# Patient Record
Sex: MALE | Race: WHITE | NOT HISPANIC OR LATINO
[De-identification: names, ages, dates, MRNs, and addresses within clinical notes are randomized per-mention and may not be internally consistent; named-entity substitution may affect disease eponyms.]

---

## 2018-09-19 ENCOUNTER — APPOINTMENT (OUTPATIENT)
Dept: NEUROSURGERY | Facility: CLINIC | Age: 63
End: 2018-09-19

## 2018-12-11 ENCOUNTER — APPOINTMENT (OUTPATIENT)
Dept: UROLOGY | Facility: CLINIC | Age: 63
End: 2018-12-11

## 2021-01-01 ENCOUNTER — APPOINTMENT (OUTPATIENT)
Dept: HEMATOLOGY ONCOLOGY | Facility: CLINIC | Age: 66
End: 2021-01-01

## 2021-01-01 ENCOUNTER — NON-APPOINTMENT (OUTPATIENT)
Age: 66
End: 2021-01-01

## 2021-01-01 ENCOUNTER — APPOINTMENT (OUTPATIENT)
Dept: GASTROENTEROLOGY | Facility: AMBULATORY MEDICAL SERVICES | Age: 66
End: 2021-01-01

## 2021-01-01 ENCOUNTER — INPATIENT (INPATIENT)
Facility: HOSPITAL | Age: 66
LOS: 0 days | End: 2021-11-08
Attending: INTERNAL MEDICINE | Admitting: INTERNAL MEDICINE
Payer: MEDICARE

## 2021-01-01 ENCOUNTER — APPOINTMENT (OUTPATIENT)
Dept: GASTROENTEROLOGY | Facility: CLINIC | Age: 66
End: 2021-01-01
Payer: MEDICARE

## 2021-01-01 ENCOUNTER — TRANSCRIPTION ENCOUNTER (OUTPATIENT)
Age: 66
End: 2021-01-01

## 2021-01-01 ENCOUNTER — APPOINTMENT (OUTPATIENT)
Dept: HEPATOLOGY | Facility: CLINIC | Age: 66
End: 2021-01-01

## 2021-01-01 ENCOUNTER — LABORATORY RESULT (OUTPATIENT)
Age: 66
End: 2021-01-01

## 2021-01-01 ENCOUNTER — APPOINTMENT (OUTPATIENT)
Dept: HEPATOLOGY | Facility: CLINIC | Age: 66
End: 2021-01-01
Payer: MEDICARE

## 2021-01-01 VITALS
DIASTOLIC BLOOD PRESSURE: 73 MMHG | OXYGEN SATURATION: 85 % | TEMPERATURE: 97 F | SYSTOLIC BLOOD PRESSURE: 119 MMHG | HEART RATE: 117 BPM

## 2021-01-01 VITALS
TEMPERATURE: 97.6 F | RESPIRATION RATE: 16 BRPM | WEIGHT: 105 LBS | HEIGHT: 69 IN | DIASTOLIC BLOOD PRESSURE: 64 MMHG | BODY MASS INDEX: 15.55 KG/M2 | SYSTOLIC BLOOD PRESSURE: 101 MMHG | HEART RATE: 94 BPM

## 2021-01-01 VITALS
RESPIRATION RATE: 16 BRPM | WEIGHT: 119 LBS | HEIGHT: 69 IN | HEART RATE: 71 BPM | TEMPERATURE: 97.4 F | DIASTOLIC BLOOD PRESSURE: 64 MMHG | OXYGEN SATURATION: 98 % | SYSTOLIC BLOOD PRESSURE: 120 MMHG | BODY MASS INDEX: 17.63 KG/M2

## 2021-01-01 VITALS — OXYGEN SATURATION: 100 %

## 2021-01-01 DIAGNOSIS — N17.9 ACUTE KIDNEY FAILURE, UNSPECIFIED: ICD-10-CM

## 2021-01-01 DIAGNOSIS — E87.5 HYPERKALEMIA: ICD-10-CM

## 2021-01-01 DIAGNOSIS — E87.2 ACIDOSIS: ICD-10-CM

## 2021-01-01 DIAGNOSIS — R17 UNSPECIFIED JAUNDICE: ICD-10-CM

## 2021-01-01 DIAGNOSIS — R93.2 ABNORMAL FINDINGS ON DIAGNOSTIC IMAGING OF LIVER AND BILIARY TRACT: ICD-10-CM

## 2021-01-01 DIAGNOSIS — L29.9 PRURITUS, UNSPECIFIED: ICD-10-CM

## 2021-01-01 DIAGNOSIS — K62.7 RADIATION PROCTITIS: ICD-10-CM

## 2021-01-01 DIAGNOSIS — A41.9 SEPSIS, UNSPECIFIED ORGANISM: ICD-10-CM

## 2021-01-01 LAB
AFP-TM SERPL-MCNC: 7.4 NG/ML
ALBUMIN FLD-MCNC: 1.9 G/DL — SIGNIFICANT CHANGE UP
ALBUMIN SERPL ELPH-MCNC: 2.3 G/DL — LOW (ref 3.3–5)
ALBUMIN SERPL ELPH-MCNC: 2.4 G/DL — LOW (ref 3.3–5)
ALBUMIN SERPL ELPH-MCNC: 2.9 G/DL — LOW (ref 3.3–5)
ALBUMIN SERPL ELPH-MCNC: 3.4 G/DL — SIGNIFICANT CHANGE UP (ref 3.3–5)
ALBUMIN SERPL ELPH-MCNC: 3.6 G/DL
ALP BLD-CCNC: 443 U/L
ALP SERPL-CCNC: 126 U/L — HIGH (ref 40–120)
ALP SERPL-CCNC: 138 U/L — HIGH (ref 40–120)
ALP SERPL-CCNC: 142 U/L — HIGH (ref 40–120)
ALP SERPL-CCNC: 168 U/L — HIGH (ref 40–120)
ALT FLD-CCNC: 110 U/L — HIGH (ref 4–41)
ALT FLD-CCNC: 1865 U/L — HIGH (ref 4–41)
ALT FLD-CCNC: 653 U/L — HIGH (ref 4–41)
ALT FLD-CCNC: 897 U/L — HIGH (ref 4–41)
ALT SERPL-CCNC: 99 U/L
ANION GAP SERPL CALC-SCNC: 12 MMOL/L
ANION GAP SERPL CALC-SCNC: 27 MMOL/L — HIGH (ref 7–14)
ANION GAP SERPL CALC-SCNC: 29 MMOL/L — HIGH (ref 7–14)
ANION GAP SERPL CALC-SCNC: 30 MMOL/L — HIGH (ref 7–14)
ANION GAP SERPL CALC-SCNC: 32 MMOL/L — HIGH (ref 7–14)
ANISOCYTOSIS BLD QL: SLIGHT — SIGNIFICANT CHANGE UP
APTT BLD: 25.3 SEC — LOW (ref 27–36.3)
AST SERPL-CCNC: 1171 U/L — HIGH (ref 4–40)
AST SERPL-CCNC: 1604 U/L — HIGH (ref 4–40)
AST SERPL-CCNC: 255 U/L — HIGH (ref 4–40)
AST SERPL-CCNC: 3380 U/L — HIGH (ref 4–40)
AST SERPL-CCNC: 89 U/L
B PERT IGG+IGM PNL SER: ABNORMAL
BASE EXCESS BLDV CALC-SCNC: -12.9 MMOL/L — LOW (ref -2–3)
BASE EXCESS BLDV CALC-SCNC: -15.3 MMOL/L — LOW (ref -2–3)
BASOPHILS # BLD AUTO: 0 K/UL — SIGNIFICANT CHANGE UP (ref 0–0.2)
BASOPHILS # BLD AUTO: 0.04 K/UL
BASOPHILS NFR BLD AUTO: 0 % — SIGNIFICANT CHANGE UP (ref 0–2)
BASOPHILS NFR BLD AUTO: 0.7 %
BILE AC SER-MCNC: 20.6 UMOL/L
BILIRUB DIRECT SERPL-MCNC: 7.5 MG/DL
BILIRUB SERPL-MCNC: 1.4 MG/DL — HIGH (ref 0.2–1.2)
BILIRUB SERPL-MCNC: 1.5 MG/DL — HIGH (ref 0.2–1.2)
BILIRUB SERPL-MCNC: 1.6 MG/DL — HIGH (ref 0.2–1.2)
BILIRUB SERPL-MCNC: 11.5 MG/DL
BILIRUB SERPL-MCNC: 2.1 MG/DL — HIGH (ref 0.2–1.2)
BLD GP AB SCN SERPL QL: NEGATIVE — SIGNIFICANT CHANGE UP
BLOOD GAS VENOUS COMPREHENSIVE RESULT: SIGNIFICANT CHANGE UP
BUN SERPL-MCNC: 14 MG/DL
BUN SERPL-MCNC: 80 MG/DL — HIGH (ref 7–23)
BUN SERPL-MCNC: 85 MG/DL — HIGH (ref 7–23)
BUN SERPL-MCNC: 86 MG/DL — HIGH (ref 7–23)
BUN SERPL-MCNC: 87 MG/DL — HIGH (ref 7–23)
CALCIUM SERPL-MCNC: 8 MG/DL — LOW (ref 8.4–10.5)
CALCIUM SERPL-MCNC: 8.2 MG/DL — LOW (ref 8.4–10.5)
CALCIUM SERPL-MCNC: 9.1 MG/DL
CALCIUM SERPL-MCNC: 9.2 MG/DL — SIGNIFICANT CHANGE UP (ref 8.4–10.5)
CALCIUM SERPL-MCNC: 9.3 MG/DL — SIGNIFICANT CHANGE UP (ref 8.4–10.5)
CANCER AG19-9 SERPL-ACNC: 5216 U/ML
CHLORIDE BLDV-SCNC: 91 MMOL/L — LOW (ref 96–108)
CHLORIDE BLDV-SCNC: 92 MMOL/L — LOW (ref 96–108)
CHLORIDE SERPL-SCNC: 105 MMOL/L
CHLORIDE SERPL-SCNC: 87 MMOL/L — LOW (ref 98–107)
CHLORIDE SERPL-SCNC: 88 MMOL/L — LOW (ref 98–107)
CHLORIDE SERPL-SCNC: 89 MMOL/L — LOW (ref 98–107)
CHLORIDE SERPL-SCNC: 94 MMOL/L — LOW (ref 98–107)
CO2 BLDV-SCNC: 13 MMOL/L — LOW (ref 22–26)
CO2 BLDV-SCNC: 15.2 MMOL/L — LOW (ref 22–26)
CO2 SERPL-SCNC: 10 MMOL/L — CRITICAL LOW (ref 22–31)
CO2 SERPL-SCNC: 10 MMOL/L — CRITICAL LOW (ref 22–31)
CO2 SERPL-SCNC: 13 MMOL/L — LOW (ref 22–31)
CO2 SERPL-SCNC: 13 MMOL/L — LOW (ref 22–31)
CO2 SERPL-SCNC: 23 MMOL/L
COLOR FLD: YELLOW
CREAT SERPL-MCNC: 0.81 MG/DL
CREAT SERPL-MCNC: 1.87 MG/DL — HIGH (ref 0.5–1.3)
CREAT SERPL-MCNC: 2.18 MG/DL — HIGH (ref 0.5–1.3)
CREAT SERPL-MCNC: 2.29 MG/DL — HIGH (ref 0.5–1.3)
CREAT SERPL-MCNC: 2.32 MG/DL — HIGH (ref 0.5–1.3)
DACRYOCYTES BLD QL SMEAR: SLIGHT — SIGNIFICANT CHANGE UP
EOSINOPHIL # BLD AUTO: 0 K/UL — SIGNIFICANT CHANGE UP (ref 0–0.5)
EOSINOPHIL # BLD AUTO: 0.26 K/UL
EOSINOPHIL # FLD: 0 % — SIGNIFICANT CHANGE UP
EOSINOPHIL NFR BLD AUTO: 0 % — SIGNIFICANT CHANGE UP (ref 0–6)
EOSINOPHIL NFR BLD AUTO: 4.7 %
FLUID INTAKE SUBSTANCE CLASS: SIGNIFICANT CHANGE UP
FLUID SEGMENTED GRANULOCYTES: 4 % — SIGNIFICANT CHANGE UP
FOLATE+VIT B12 SERBLD-IMP: 0 % — SIGNIFICANT CHANGE UP
GAS PNL BLDA: SIGNIFICANT CHANGE UP
GAS PNL BLDV: 126 MMOL/L — LOW (ref 136–145)
GAS PNL BLDV: 128 MMOL/L — LOW (ref 136–145)
GIANT PLATELETS BLD QL SMEAR: PRESENT — SIGNIFICANT CHANGE UP
GIANT PLATELETS BLD QL SMEAR: PRESENT — SIGNIFICANT CHANGE UP
GLUCOSE BLDC GLUCOMTR-MCNC: 123 MG/DL — HIGH (ref 70–99)
GLUCOSE BLDC GLUCOMTR-MCNC: 35 MG/DL — CRITICAL LOW (ref 70–99)
GLUCOSE BLDV-MCNC: 100 MG/DL — HIGH (ref 70–99)
GLUCOSE BLDV-MCNC: 145 MG/DL — HIGH (ref 70–99)
GLUCOSE FLD-MCNC: 108 MG/DL — SIGNIFICANT CHANGE UP
GLUCOSE SERPL-MCNC: 104 MG/DL — HIGH (ref 70–99)
GLUCOSE SERPL-MCNC: 119 MG/DL
GLUCOSE SERPL-MCNC: 138 MG/DL — HIGH (ref 70–99)
GLUCOSE SERPL-MCNC: 16 MG/DL — CRITICAL LOW (ref 70–99)
GLUCOSE SERPL-MCNC: 62 MG/DL — LOW (ref 70–99)
GRAM STN FLD: SIGNIFICANT CHANGE UP
HCO3 BLDV-SCNC: 12 MMOL/L — LOW (ref 22–29)
HCO3 BLDV-SCNC: 14 MMOL/L — LOW (ref 22–29)
HCT VFR BLD CALC: 16.9 % — CRITICAL LOW (ref 39–50)
HCT VFR BLD CALC: 20.2 % — CRITICAL LOW (ref 39–50)
HCT VFR BLD CALC: 22.5 % — LOW (ref 39–50)
HCT VFR BLD CALC: 23.7 % — LOW (ref 39–50)
HCT VFR BLD CALC: 24.8 %
HCT VFR BLDA CALC: 22 % — LOW (ref 39–51)
HCT VFR BLDA CALC: 23 % — LOW (ref 39–51)
HGB BLD CALC-MCNC: 7.4 G/DL — LOW (ref 13–17)
HGB BLD CALC-MCNC: 7.6 G/DL — LOW (ref 13–17)
HGB BLD-MCNC: 5.2 G/DL — CRITICAL LOW (ref 13–17)
HGB BLD-MCNC: 6 G/DL — CRITICAL LOW (ref 13–17)
HGB BLD-MCNC: 7.3 G/DL — LOW (ref 13–17)
HGB BLD-MCNC: 7.3 G/DL — LOW (ref 13–17)
HGB BLD-MCNC: 8 G/DL
HGB FREE PLAS-MCNC: <0.2 MG/DL
HYPOCHROMIA BLD QL: SIGNIFICANT CHANGE UP
IANC: 10.92 K/UL — HIGH (ref 1.5–8.5)
IANC: 12.05 K/UL — HIGH (ref 1.5–8.5)
IANC: 12.28 K/UL — HIGH (ref 1.5–8.5)
IMM GRANULOCYTES NFR BLD AUTO: 0.8 % — SIGNIFICANT CHANGE UP (ref 0–1.5)
IMM GRANULOCYTES NFR BLD AUTO: 0.9 %
INR BLD: 1.57 RATIO — HIGH (ref 0.88–1.16)
INR PPP: 1 RATIO
LACTATE BLDV-MCNC: 15.5 MMOL/L — CRITICAL HIGH (ref 0.5–2)
LACTATE BLDV-MCNC: 15.9 MMOL/L — CRITICAL HIGH (ref 0.5–2)
LDH SERPL L TO P-CCNC: 212 U/L — SIGNIFICANT CHANGE UP
LYMPHOCYTES # BLD AUTO: 0.48 K/UL — LOW (ref 1–3.3)
LYMPHOCYTES # BLD AUTO: 0.5 K/UL — LOW (ref 1–3.3)
LYMPHOCYTES # BLD AUTO: 0.54 K/UL — LOW (ref 1–3.3)
LYMPHOCYTES # BLD AUTO: 0.59 K/UL
LYMPHOCYTES # BLD AUTO: 3.5 % — LOW (ref 13–44)
LYMPHOCYTES # BLD AUTO: 3.6 % — LOW (ref 13–44)
LYMPHOCYTES # BLD AUTO: 4.4 % — LOW (ref 13–44)
LYMPHOCYTES # FLD: 29 % — SIGNIFICANT CHANGE UP
LYMPHOCYTES NFR BLD AUTO: 10.7 %
MACROCYTES BLD QL: SLIGHT — SIGNIFICANT CHANGE UP
MAGNESIUM SERPL-MCNC: 2.5 MG/DL — SIGNIFICANT CHANGE UP (ref 1.6–2.6)
MAN DIFF?: NORMAL
MANUAL SMEAR VERIFICATION: SIGNIFICANT CHANGE UP
MCHC RBC-ENTMCNC: 28.7 PG — SIGNIFICANT CHANGE UP (ref 27–34)
MCHC RBC-ENTMCNC: 29.1 PG — SIGNIFICANT CHANGE UP (ref 27–34)
MCHC RBC-ENTMCNC: 29.3 PG — SIGNIFICANT CHANGE UP (ref 27–34)
MCHC RBC-ENTMCNC: 29.6 PG — SIGNIFICANT CHANGE UP (ref 27–34)
MCHC RBC-ENTMCNC: 29.7 GM/DL — LOW (ref 32–36)
MCHC RBC-ENTMCNC: 30.8 GM/DL — LOW (ref 32–36)
MCHC RBC-ENTMCNC: 30.8 GM/DL — LOW (ref 32–36)
MCHC RBC-ENTMCNC: 32.3 GM/DL
MCHC RBC-ENTMCNC: 32.4 GM/DL — SIGNIFICANT CHANGE UP (ref 32–36)
MCHC RBC-ENTMCNC: 35.7 PG
MCV RBC AUTO: 110.7 FL
MCV RBC AUTO: 91.1 FL — SIGNIFICANT CHANGE UP (ref 80–100)
MCV RBC AUTO: 94.4 FL — SIGNIFICANT CHANGE UP (ref 80–100)
MCV RBC AUTO: 95.2 FL — SIGNIFICANT CHANGE UP (ref 80–100)
MCV RBC AUTO: 96.7 FL — SIGNIFICANT CHANGE UP (ref 80–100)
MESOTHL CELL # FLD: 4 % — SIGNIFICANT CHANGE UP
METAMYELOCYTES # FLD: 0.9 % — SIGNIFICANT CHANGE UP (ref 0–1)
MONOCYTES # BLD AUTO: 0.53 K/UL — SIGNIFICANT CHANGE UP (ref 0–0.9)
MONOCYTES # BLD AUTO: 0.62 K/UL — SIGNIFICANT CHANGE UP (ref 0–0.9)
MONOCYTES # BLD AUTO: 0.68 K/UL
MONOCYTES # BLD AUTO: 1.88 K/UL — HIGH (ref 0–0.9)
MONOCYTES NFR BLD AUTO: 12.3 %
MONOCYTES NFR BLD AUTO: 13.3 % — SIGNIFICANT CHANGE UP (ref 2–14)
MONOCYTES NFR BLD AUTO: 4.3 % — SIGNIFICANT CHANGE UP (ref 2–14)
MONOCYTES NFR BLD AUTO: 4.7 % — SIGNIFICANT CHANGE UP (ref 2–14)
MONOS+MACROS # FLD: 63 % — SIGNIFICANT CHANGE UP
NEUTROPHILS # BLD AUTO: 11.25 K/UL — HIGH (ref 1.8–7.4)
NEUTROPHILS # BLD AUTO: 11.65 K/UL — HIGH (ref 1.8–7.4)
NEUTROPHILS # BLD AUTO: 12.05 K/UL — HIGH (ref 1.8–7.4)
NEUTROPHILS # BLD AUTO: 3.89 K/UL
NEUTROPHILS NFR BLD AUTO: 70.7 %
NEUTROPHILS NFR BLD AUTO: 74.3 % — SIGNIFICANT CHANGE UP (ref 43–77)
NEUTROPHILS NFR BLD AUTO: 90.9 % — HIGH (ref 43–77)
NEUTROPHILS NFR BLD AUTO: 91.3 % — HIGH (ref 43–77)
NEUTS BAND # BLD: 8 % — HIGH (ref 0–6)
NRBC # BLD: 0 /100 WBCS — SIGNIFICANT CHANGE UP
NRBC # BLD: 0 /100 WBCS — SIGNIFICANT CHANGE UP
NRBC # BLD: 2 /100 — HIGH (ref 0–0)
NRBC # FLD: 0.03 K/UL — HIGH
NRBC # FLD: 0.03 K/UL — HIGH
OTHER CELLS FLD MANUAL: 0 % — SIGNIFICANT CHANGE UP
OVALOCYTES BLD QL SMEAR: SLIGHT — SIGNIFICANT CHANGE UP
PCO2 BLDV: 33 MMHG — LOW (ref 42–55)
PCO2 BLDV: 36 MMHG — LOW (ref 42–55)
PH BLDV: 7.17 — LOW (ref 7.32–7.43)
PH BLDV: 7.2 — LOW (ref 7.32–7.43)
PHOSPHATE SERPL-MCNC: 7.8 MG/DL — HIGH (ref 2.5–4.5)
PLAT MORPH BLD: ABNORMAL
PLAT MORPH BLD: NORMAL — SIGNIFICANT CHANGE UP
PLATELET # BLD AUTO: 266 K/UL
PLATELET # BLD AUTO: 294 K/UL — SIGNIFICANT CHANGE UP (ref 150–400)
PLATELET # BLD AUTO: 334 K/UL — SIGNIFICANT CHANGE UP (ref 150–400)
PLATELET # BLD AUTO: 372 K/UL — SIGNIFICANT CHANGE UP (ref 150–400)
PLATELET # BLD AUTO: 522 K/UL — HIGH (ref 150–400)
PLATELET COUNT - ESTIMATE: ABNORMAL
PLATELET COUNT - ESTIMATE: NORMAL — SIGNIFICANT CHANGE UP
PO2 BLDV: 23 MMHG — SIGNIFICANT CHANGE UP
PO2 BLDV: 25 MMHG — SIGNIFICANT CHANGE UP
POIKILOCYTOSIS BLD QL AUTO: SLIGHT — SIGNIFICANT CHANGE UP
POIKILOCYTOSIS BLD QL AUTO: SLIGHT — SIGNIFICANT CHANGE UP
POLYCHROMASIA BLD QL SMEAR: SLIGHT — SIGNIFICANT CHANGE UP
POLYCHROMASIA BLD QL SMEAR: SLIGHT — SIGNIFICANT CHANGE UP
POTASSIUM BLDV-SCNC: 6.2 MMOL/L — CRITICAL HIGH (ref 3.5–5.1)
POTASSIUM BLDV-SCNC: 6.2 MMOL/L — CRITICAL HIGH (ref 3.5–5.1)
POTASSIUM SERPL-MCNC: 6 MMOL/L — HIGH (ref 3.5–5.3)
POTASSIUM SERPL-MCNC: 6.2 MMOL/L — CRITICAL HIGH (ref 3.5–5.3)
POTASSIUM SERPL-MCNC: 6.2 MMOL/L — CRITICAL HIGH (ref 3.5–5.3)
POTASSIUM SERPL-MCNC: 6.4 MMOL/L — CRITICAL HIGH (ref 3.5–5.3)
POTASSIUM SERPL-SCNC: 4.2 MMOL/L
POTASSIUM SERPL-SCNC: 6 MMOL/L — HIGH (ref 3.5–5.3)
POTASSIUM SERPL-SCNC: 6.2 MMOL/L — CRITICAL HIGH (ref 3.5–5.3)
POTASSIUM SERPL-SCNC: 6.2 MMOL/L — CRITICAL HIGH (ref 3.5–5.3)
POTASSIUM SERPL-SCNC: 6.4 MMOL/L — CRITICAL HIGH (ref 3.5–5.3)
PROT FLD-MCNC: 3 G/DL — SIGNIFICANT CHANGE UP
PROT SERPL-MCNC: 3.9 G/DL — LOW (ref 6–8.3)
PROT SERPL-MCNC: 4.2 G/DL — LOW (ref 6–8.3)
PROT SERPL-MCNC: 4.8 G/DL — LOW (ref 6–8.3)
PROT SERPL-MCNC: 5.7 G/DL
PROT SERPL-MCNC: 5.9 G/DL — LOW (ref 6–8.3)
PROTHROM AB SERPL-ACNC: 17.7 SEC — HIGH (ref 10.6–13.6)
PT BLD: 11.8 SEC
RBC # BLD: 1.79 M/UL — LOW (ref 4.2–5.8)
RBC # BLD: 2.09 M/UL — LOW (ref 4.2–5.8)
RBC # BLD: 2.24 M/UL
RBC # BLD: 2.47 M/UL — LOW (ref 4.2–5.8)
RBC # BLD: 2.49 M/UL — LOW (ref 4.2–5.8)
RBC # FLD: 16.9 % — HIGH (ref 10.3–14.5)
RBC # FLD: 18.3 % — HIGH (ref 10.3–14.5)
RBC # FLD: 18.4 % — HIGH (ref 10.3–14.5)
RBC # FLD: 18.6 % — HIGH (ref 10.3–14.5)
RBC # FLD: 21.1 %
RBC BLD AUTO: ABNORMAL
RBC BLD AUTO: ABNORMAL
RCV VOL RI: <1000 CELLS/UL — HIGH (ref 0–5)
RH IG SCN BLD-IMP: POSITIVE — SIGNIFICANT CHANGE UP
SAO2 % BLDV: 28.8 % — SIGNIFICANT CHANGE UP
SAO2 % BLDV: 31.5 % — SIGNIFICANT CHANGE UP
SARS-COV-2 RNA SPEC QL NAA+PROBE: SIGNIFICANT CHANGE UP
SCHISTOCYTES BLD QL AUTO: SLIGHT — SIGNIFICANT CHANGE UP
SODIUM SERPL-SCNC: 129 MMOL/L — LOW (ref 135–145)
SODIUM SERPL-SCNC: 130 MMOL/L — LOW (ref 135–145)
SODIUM SERPL-SCNC: 130 MMOL/L — LOW (ref 135–145)
SODIUM SERPL-SCNC: 133 MMOL/L — LOW (ref 135–145)
SODIUM SERPL-SCNC: 140 MMOL/L
SPECIMEN SOURCE FLD: SIGNIFICANT CHANGE UP
SPECIMEN SOURCE: SIGNIFICANT CHANGE UP
SPHEROCYTES BLD QL SMEAR: SLIGHT — SIGNIFICANT CHANGE UP
TOTAL CELLS COUNTED, BODY FLUID: 100 CELLS — SIGNIFICANT CHANGE UP
TOTAL NUCLEATED CELL COUNT, BODY FLUID: 712 CELLS/UL — HIGH (ref 0–5)
TUBE TYPE: SIGNIFICANT CHANGE UP
WBC # BLD: 12.32 K/UL — HIGH (ref 3.8–10.5)
WBC # BLD: 13.26 K/UL — HIGH (ref 3.8–10.5)
WBC # BLD: 14.16 K/UL — HIGH (ref 3.8–10.5)
WBC # BLD: 8.53 K/UL — SIGNIFICANT CHANGE UP (ref 3.8–10.5)
WBC # FLD AUTO: 12.32 K/UL — HIGH (ref 3.8–10.5)
WBC # FLD AUTO: 13.26 K/UL — HIGH (ref 3.8–10.5)
WBC # FLD AUTO: 14.16 K/UL — HIGH (ref 3.8–10.5)
WBC # FLD AUTO: 5.51 K/UL
WBC # FLD AUTO: 8.53 K/UL — SIGNIFICANT CHANGE UP (ref 3.8–10.5)

## 2021-01-01 PROCEDURE — 99291 CRITICAL CARE FIRST HOUR: CPT | Mod: CS,GC

## 2021-01-01 PROCEDURE — 99203 OFFICE O/P NEW LOW 30 MIN: CPT

## 2021-01-01 PROCEDURE — 74176 CT ABD & PELVIS W/O CONTRAST: CPT | Mod: 26,MA

## 2021-01-01 PROCEDURE — 99223 1ST HOSP IP/OBS HIGH 75: CPT | Mod: GC

## 2021-01-01 PROCEDURE — 99214 OFFICE O/P EST MOD 30 MIN: CPT

## 2021-01-01 PROCEDURE — 71045 X-RAY EXAM CHEST 1 VIEW: CPT | Mod: 26

## 2021-01-01 RX ORDER — MORPHINE SULFATE 50 MG/1
4 CAPSULE, EXTENDED RELEASE ORAL ONCE
Refills: 0 | Status: DISCONTINUED | OUTPATIENT
Start: 2021-01-01 | End: 2021-01-01

## 2021-01-01 RX ORDER — CHOLESTYRAMINE 4 G/9G
4 POWDER, FOR SUSPENSION ORAL 3 TIMES DAILY
Qty: 360 | Refills: 0 | Status: ACTIVE | COMMUNITY
Start: 2021-01-01 | End: 1900-01-01

## 2021-01-01 RX ORDER — SERTRALINE 25 MG/1
1 TABLET, FILM COATED ORAL
Qty: 0 | Refills: 0 | DISCHARGE

## 2021-01-01 RX ORDER — SODIUM BICARBONATE 1 MEQ/ML
50 SYRINGE (ML) INTRAVENOUS ONCE
Refills: 0 | Status: COMPLETED | OUTPATIENT
Start: 2021-01-01 | End: 2021-01-01

## 2021-01-01 RX ORDER — SODIUM CHLORIDE 9 MG/ML
2000 INJECTION INTRAMUSCULAR; INTRAVENOUS; SUBCUTANEOUS ONCE
Refills: 0 | Status: COMPLETED | OUTPATIENT
Start: 2021-01-01 | End: 2021-01-01

## 2021-01-01 RX ORDER — CYCLOBENZAPRINE HYDROCHLORIDE 10 MG/1
1 TABLET, FILM COATED ORAL
Qty: 0 | Refills: 0 | DISCHARGE

## 2021-01-01 RX ORDER — PHENYLEPHRINE HYDROCHLORIDE 10 MG/ML
100 INJECTION INTRAVENOUS ONCE
Refills: 0 | Status: COMPLETED | OUTPATIENT
Start: 2021-01-01 | End: 2021-01-01

## 2021-01-01 RX ORDER — TRAMADOL HYDROCHLORIDE 50 MG/1
1 TABLET ORAL
Qty: 0 | Refills: 0 | DISCHARGE

## 2021-01-01 RX ORDER — NOREPINEPHRINE BITARTRATE/D5W 8 MG/250ML
0.05 PLASTIC BAG, INJECTION (ML) INTRAVENOUS
Qty: 8 | Refills: 0 | Status: DISCONTINUED | OUTPATIENT
Start: 2021-01-01 | End: 2021-01-01

## 2021-01-01 RX ORDER — OMEPRAZOLE 40 MG/1
40 CAPSULE, DELAYED RELEASE ORAL
Qty: 1 | Refills: 5 | Status: DISCONTINUED | COMMUNITY
Start: 2021-01-01 | End: 2021-01-01

## 2021-01-01 RX ORDER — VANCOMYCIN HCL 1 G
1000 VIAL (EA) INTRAVENOUS ONCE
Refills: 0 | Status: COMPLETED | OUTPATIENT
Start: 2021-01-01 | End: 2021-01-01

## 2021-01-01 RX ORDER — DEXTROSE 50 % IN WATER 50 %
50 SYRINGE (ML) INTRAVENOUS ONCE
Refills: 0 | Status: COMPLETED | OUTPATIENT
Start: 2021-01-01 | End: 2021-01-01

## 2021-01-01 RX ORDER — FENTANYL CITRATE 50 UG/ML
50 INJECTION INTRAVENOUS ONCE
Refills: 0 | Status: DISCONTINUED | OUTPATIENT
Start: 2021-01-01 | End: 2021-01-01

## 2021-01-01 RX ORDER — DOXEPIN HCL 100 MG
1 CAPSULE ORAL
Qty: 0 | Refills: 0 | DISCHARGE

## 2021-01-01 RX ORDER — SODIUM BICARBONATE 1 MEQ/ML
50 SYRINGE (ML) INTRAVENOUS ONCE
Refills: 0 | Status: DISCONTINUED | OUTPATIENT
Start: 2021-01-01 | End: 2021-01-01

## 2021-01-01 RX ORDER — SODIUM BICARBONATE 1 MEQ/ML
0.23 SYRINGE (ML) INTRAVENOUS
Qty: 150 | Refills: 0 | Status: DISCONTINUED | OUTPATIENT
Start: 2021-01-01 | End: 2021-01-01

## 2021-01-01 RX ORDER — FUROSEMIDE 40 MG
1 TABLET ORAL
Qty: 0 | Refills: 0 | DISCHARGE

## 2021-01-01 RX ORDER — PIPERACILLIN AND TAZOBACTAM 4; .5 G/20ML; G/20ML
3.38 INJECTION, POWDER, LYOPHILIZED, FOR SOLUTION INTRAVENOUS ONCE
Refills: 0 | Status: COMPLETED | OUTPATIENT
Start: 2021-01-01 | End: 2021-01-01

## 2021-01-01 RX ORDER — SODIUM CHLORIDE 9 MG/ML
500 INJECTION INTRAMUSCULAR; INTRAVENOUS; SUBCUTANEOUS ONCE
Refills: 0 | Status: COMPLETED | OUTPATIENT
Start: 2021-01-01 | End: 2021-01-01

## 2021-01-01 RX ORDER — CALCIUM GLUCONATE 100 MG/ML
1 VIAL (ML) INTRAVENOUS ONCE
Refills: 0 | Status: COMPLETED | OUTPATIENT
Start: 2021-01-01 | End: 2021-01-01

## 2021-01-01 RX ORDER — INFLUENZA VIRUS VACCINE 15; 15; 15; 15 UG/.5ML; UG/.5ML; UG/.5ML; UG/.5ML
0.7 SUSPENSION INTRAMUSCULAR ONCE
Refills: 0 | Status: DISCONTINUED | OUTPATIENT
Start: 2021-01-01 | End: 2021-01-01

## 2021-01-01 RX ORDER — PANTOPRAZOLE SODIUM 20 MG/1
1 TABLET, DELAYED RELEASE ORAL
Qty: 0 | Refills: 0 | DISCHARGE

## 2021-01-01 RX ORDER — INSULIN HUMAN 100 [IU]/ML
10 INJECTION, SOLUTION SUBCUTANEOUS ONCE
Refills: 0 | Status: COMPLETED | OUTPATIENT
Start: 2021-01-01 | End: 2021-01-01

## 2021-01-01 RX ORDER — SPIRONOLACTONE 25 MG/1
1 TABLET, FILM COATED ORAL
Qty: 0 | Refills: 0 | DISCHARGE

## 2021-01-01 RX ORDER — CHLORHEXIDINE GLUCONATE 213 G/1000ML
1 SOLUTION TOPICAL
Refills: 0 | Status: DISCONTINUED | OUTPATIENT
Start: 2021-01-01 | End: 2021-01-01

## 2021-01-01 RX ADMIN — Medication 250 MILLIGRAM(S): at 10:30

## 2021-01-01 RX ADMIN — Medication 75 MEQ/KG/HR: at 08:46

## 2021-01-01 RX ADMIN — PHENYLEPHRINE HYDROCHLORIDE 100 MICROGRAM(S): 10 INJECTION INTRAVENOUS at 08:10

## 2021-01-01 RX ADMIN — MORPHINE SULFATE 4 MILLIGRAM(S): 50 CAPSULE, EXTENDED RELEASE ORAL at 08:32

## 2021-01-01 RX ADMIN — INSULIN HUMAN 10 UNIT(S): 100 INJECTION, SOLUTION SUBCUTANEOUS at 02:32

## 2021-01-01 RX ADMIN — Medication 50 MILLILITER(S): at 02:31

## 2021-01-01 RX ADMIN — Medication 4.69 MICROGRAM(S)/KG/MIN: at 08:13

## 2021-01-01 RX ADMIN — Medication 100 GRAM(S): at 02:29

## 2021-01-01 RX ADMIN — SODIUM CHLORIDE 500 MILLILITER(S): 9 INJECTION INTRAMUSCULAR; INTRAVENOUS; SUBCUTANEOUS at 09:30

## 2021-01-01 RX ADMIN — Medication 50 MILLILITER(S): at 10:07

## 2021-01-01 RX ADMIN — PHENYLEPHRINE HYDROCHLORIDE 100 MICROGRAM(S): 10 INJECTION INTRAVENOUS at 08:09

## 2021-01-01 RX ADMIN — Medication 50 MILLIEQUIVALENT(S): at 08:13

## 2021-01-01 RX ADMIN — FENTANYL CITRATE 50 MICROGRAM(S): 50 INJECTION INTRAVENOUS at 08:32

## 2021-01-01 RX ADMIN — MORPHINE SULFATE 4 MILLIGRAM(S): 50 CAPSULE, EXTENDED RELEASE ORAL at 01:07

## 2021-01-01 RX ADMIN — SODIUM CHLORIDE 500 MILLILITER(S): 9 INJECTION INTRAMUSCULAR; INTRAVENOUS; SUBCUTANEOUS at 05:40

## 2021-01-01 RX ADMIN — FENTANYL CITRATE 50 MICROGRAM(S): 50 INJECTION INTRAVENOUS at 05:40

## 2021-01-01 RX ADMIN — PIPERACILLIN AND TAZOBACTAM 200 GRAM(S): 4; .5 INJECTION, POWDER, LYOPHILIZED, FOR SOLUTION INTRAVENOUS at 08:46

## 2021-01-01 RX ADMIN — SODIUM CHLORIDE 2000 MILLILITER(S): 9 INJECTION INTRAMUSCULAR; INTRAVENOUS; SUBCUTANEOUS at 08:05

## 2021-06-25 PROBLEM — K62.7 RADIATION PROCTITIS: Status: ACTIVE | Noted: 2021-01-01

## 2021-06-25 NOTE — ASSESSMENT
[FreeTextEntry1] : 66-year-old male\par Radiation proctitis\par Excellent response to mesalamine/Canasa suppositories\par \par Plan\par Discussion with patient regarding the excellent safety profile of mesalamine suppositories\par Common recommendation for twice yearly blood work to screen for any renal insufficiency, idiosyncratic reactions reported\par We also discussed how unusual it was that he responded so nicely to mesalamine suppositories, as this is atypical for radiation proctitis\par Patient states that he research the topic, and was also not expecting such a good and durable response\par Encouraged his screening colonoscopy scheduled shortly with his colorectal surgeon\par Encouraged to return as needed

## 2021-06-25 NOTE — REVIEW OF SYSTEMS
[Recent Weight Loss (___ Lbs)] : recent [unfilled] ~Ulb weight loss [As Noted in HPI] : as noted in HPI [Negative] : Respiratory

## 2021-06-25 NOTE — HISTORY OF PRESENT ILLNESS
[de-identified] : 66-year-old male, diagnosed with prostate cancer 2016, external beam radiation 2017\par Approximately 9 months after completion of therapy developed mucus and blood per rectum, diagnosed with radiation proctitis\par Has been highly successful over the years with treatment with intermittent Canasa suppository\par Rapid resolution of complaints\par Denies diarrhea\par Currently followed by Dr. Long Duffy, plans for colonoscopy in the fall\par \par Patient is specifically curious regarding the safety of mesalamine, whether  there is any alternative approach that I would recommend\par \par Patient is noted to have lost weight since his last office visit here many years ago, which he attributes to diet restriction, avoiding sweets etc.

## 2021-06-25 NOTE — PHYSICAL EXAM
[General Appearance - Alert] : alert [General Appearance - In No Acute Distress] : in no acute distress [Oriented To Time, Place, And Person] : oriented to person, place, and time [Impaired Insight] : insight and judgment were intact [Affect] : the affect was normal [FreeTextEntry1] : Highly pleasant affect

## 2021-09-21 PROBLEM — R17 JAUNDICE: Status: ACTIVE | Noted: 2021-01-01

## 2021-09-21 PROBLEM — R17 ELEVATED BILIRUBIN: Status: ACTIVE | Noted: 2021-01-01

## 2021-09-21 PROBLEM — L29.9 PRURITUS: Status: ACTIVE | Noted: 2021-01-01

## 2021-09-21 PROBLEM — R93.2 ABNORMAL LIVER CT: Status: ACTIVE | Noted: 2021-01-01

## 2021-09-22 NOTE — REVIEW OF SYSTEMS
[As Noted in HPI] : as noted in HPI [Negative] : Neurological [Fever] : no fever [Chills] : no chills [Chest Pain] : no chest pain [Palpitations] : no palpitations [Shortness Of Breath] : no shortness of breath [Wheezing] : no wheezing [Cough] : no cough [Abdominal Pain] : no abdominal pain [Vomiting] : no vomiting [Constipation] : no constipation [Diarrhea] : no diarrhea [Melena] : no melena [Dysuria] : no dysuria [Confused] : no confusion [Dizziness] : no dizziness [Fainting] : no fainting [FreeTextEntry3] : sclera icterus  [FreeTextEntry8] : dark urine ranging from brown to dark yellow [de-identified] : jaundice

## 2021-09-22 NOTE — PHYSICAL EXAM
[Scleral Icterus] : Scleral Icterus noted [Jaundice] : Jaundice [General Appearance - Alert] : alert [Outer Ear] : the ears and nose were normal in appearance [Neck Appearance] : the appearance of the neck was normal [Jugular Venous Distention Increased] : there was no jugular-venous distention [] : no respiratory distress [Respiration, Rhythm And Depth] : normal respiratory rhythm and effort [Exaggerated Use Of Accessory Muscles For Inspiration] : no accessory muscle use [Heart Rate And Rhythm] : heart rate was normal and rhythm regular [Heart Sounds] : normal S1 and S2 [Edema] : there was no peripheral edema [Bowel Sounds] : normal bowel sounds [Abdomen Soft] : soft [Abdomen Tenderness] : non-tender [No CVA Tenderness] : no ~M costovertebral angle tenderness [Abnormal Walk] : normal gait [Oriented To Time, Place, And Person] : oriented to person, place, and time [Impaired Insight] : insight and judgment were intact [Affect] : the affect was normal [Abdominal Bruit] : no abdominal bruit [Abdominal  Ascites] : no ascites [Asterixis] : no asterixis observed [FreeTextEntry1] : jaundice

## 2021-09-22 NOTE — HISTORY OF PRESENT ILLNESS
[Fever] : denies fever [Yellow Skin Or Eyes (Jaundice)] : jaundice stable [Abdominal Pain] : denies abdominal pain [Urine Tests Nonspecific Abnormal Findings Biliuria] : dark urine stable [Light Colored Bowel Movement (Acholic Stools)] : denies pale stools [Itching (Pruritus)] : pruritis worsened [Shortness Of Breath] : denies shortness of breath [de-identified] : Mr. Chavez is a 66 year old male with a hx of Prostate Ca (s/p radiation). 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 F/u at [facility] to have a ERCP with biliary stent placement completed 09/13/2021; s/p liver biopsy 09/16/2021 revealing no malignancy. Presents today for initial eval regarding, is clearly jaundice with sclera icterus and frail/cachetic in appearance. C/o intense itching and yellowing of skin and dark urine. Denies chest pain/palpitations, SOB, ab pain, n/v, melena/BRBPR, and dizziness/falling. 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 Distal pancreatic atrophy with pancreatic ductal dilation. Resultant biliary ductal dilation and hydropic gallbladder. 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 Multiple hepatic masses, compatible with metastases. Indeterminate small splenic lesion.\par -09/13/2021: ERCP +Grade II varices found in lower third of esophagus, GI  hemorrhage from superficial biopsies controlled with endoclips. Successfully stented with uncovered metal stent. \par -09/15/2021: liver biopsy right lobe--liver parenchyma with bile pigment in sinusoidal reticuloendothelial cells and changes suggestive of large duct obstruction. Negative for malignancy. Biopsy may represent sampling error. \par \par ROS as below\par

## 2021-09-22 NOTE — ASSESSMENT
[FreeTextEntry1] : Mr. Chavez is a 66 year old male with a hx of Prostate Ca (s/p radiation). 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 F/u at [facility] to have a ERCP with biliary stent placement completed 09/13/2021; s/p liver biopsy 09/16/2021 revealing no malignancy. Presents today for initial eval.\par \par 1. Abnormal CT findings\par -multiple lesions on CT of ab and pelvis, f/u MRI/MRCP ordered to further evaluate and outside CD from prev CT ab to be uploaded for radiology review; potential need for repeat biopsy. Liver biopsy from 09/16/2021 revealed no malignancy.\par -repeat BW to trend LFT's, denies LE edema/Ascites/HE\par -appears jaundice with sclera icterus and c/o of intense itching, RX cholestyramine 4gm TID\par -will present to TB for further eval of imaging and review management options\par -reached out to MD De La Rosa regarding oncology consult, reached out to MD Morin for potential target biopsy area to repeat liver biopsy\par \par 2. HM\par -COL -pt stated completed in 2012, repeat soon\par -maintains daily exercise via stretching and Pilates \par -Covid vaccination completed 03/2021 via [employer] \par \par Plan:\par -Repeat BW and completed MRI/MRCP ASAP\par -RTC 2 weeks\par -RX cholestyramine 4gm packet TID\par -review outside imaging with radiology for a second target biopsy area\par -contacted MD De La Rosa regarding oncology consult appointment\par -placed case on TB list for review to discuss management and pt options 09/30/2021\par

## 2021-11-07 NOTE — ED ADULT NURSE NOTE - CHIEF COMPLAINT QUOTE
Pt. complains of difficulty breathing starting yesterday. Denies any chest pain. Pt. appears pale and diaphoretic. sat 85% on RA. hx of pancreatic ca. left sided sensory deficits x this am

## 2021-11-07 NOTE — ED ADULT TRIAGE NOTE - CHIEF COMPLAINT QUOTE
Pt. complains of difficulty breathing starting yesterday. Denies any chest pain. Pt. appears pale and diaphoretic. sat 85% on RA. hx of pancreatic ca.

## 2021-11-07 NOTE — ED ADULT NURSE NOTE - OBJECTIVE STATEMENT
QUINCY RN - arrives to room 24 for respiratory distress starting this afternoon with lower abd pain - HX pancreatic CA not on treatment - 80% SPO2 on room air, tachypneic to 30's RR per min, placed on 4L nasal cannula - abd appears severely distended with bilateral ankle pitting edema - placed on warming blanket for hypothermia, placed on cardiac monitoring / SPO2 - report given to primary RN Sal

## 2021-11-08 NOTE — CONSULT NOTE ADULT - PROBLEM SELECTOR RECOMMENDATION 2
Pt. with hyperkalemia in the setting of MAMAE. Serum potassium was elevated to 6.2 on admission, Received medical management. Repeat Serum potassium is elevated to 6 today. Agree with sodium bicarb infusion for now. Monitor Serum potassium daily. Plan for HD as mentioned above.

## 2021-11-08 NOTE — CONSULT NOTE ADULT - ATTENDING COMMENTS
MAAME  Hyperkalemia  Enlarged prostate with known prostate ca s/p RT    Plan for RRT for hyperkalemia and MAAME once line is placed by primary team. Will also try to reach oncologist regarding prognosis and plan of care.

## 2021-11-08 NOTE — ED PROVIDER NOTE - NSICDXPASTMEDICALHX_GEN_ALL_CORE_FT
PAST MEDICAL HISTORY:  Abdominal pain with vomiting      PAST MEDICAL HISTORY:  Pancreatic cancer

## 2021-11-08 NOTE — ED PROVIDER NOTE - NS ED ROS FT
CONST: no fevers, no chills. Pos hypothermic  EYES: no pain, no vision changes. icteric  ENT: no sore throat, no ear pain, no change in hearing  CV: no chest pain, no leg swelling  RESP:  no cough. Pos SOB  ABD:no diarrhea. Pos n/v, abdominal pain  : no dysuria, no flank pain, no hematuria  MSK:  no extremity pain. Pos lower back pain  NEURO: no headache or additional neurologic complaints  SKIN: jaundice.

## 2021-11-08 NOTE — PROCEDURE NOTE - NSPROCDETAILS_GEN_ALL_CORE
guidewire recovered/lumen(s) aspirated and flushed/ultrasound guidance with use of sterile gel and probe cove

## 2021-11-08 NOTE — ED PROVIDER NOTE - ATTENDING CONTRIBUTION TO CARE
The patient is a 66y Male who has a past medical and surgery history of Pancreatic CA PTED with SOB LLQP NV since noon as described    Vital Signs Last 24 Hrs  PE: as described; my additions and exceptions are noted in the chart    DATA:  EKG: pending at time of evaluation  LAB: Pending at time of evaluation    IMPRESSION/RISK:  Dx=Large ascites with   Consideration include Pericardial effusion vs hepatic venous/IVC obstruction prox to Liver as abdomen and extremities are swollen if obstruction probable distal given degree of distension   Plan  labs  CTScan   bedside echo to eval for significant effusion from metastatic disease   Will perform paracentesis either in ED or floors for symptom relief  dispo as per results and response

## 2021-11-08 NOTE — CONSULT NOTE ADULT - SUBJECTIVE AND OBJECTIVE BOX
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    Nephrology consultation requested for MAAME, hyperkalemia.   On admission, Scr was elevated to 2.32 with Serum potassium of 6.2 on 11/8/21. On review of [facility], it was found that Scr was 0.81 on 9/21/21. Pt. denies Hx of chronic kidney disease. Reported Hx of kidney Ca on the mother side. Denies use of NSAIDs/ motrin recently. Pt. stated that he was diagnosed with pancreatic cancer 3 weeks ago and also told about ascites 1 week ago. CT abdo done on 11/8 showed distended stomach and ascites. Pt. received medical management for hyperkalemia. Also received blood transfusion for low Hb. Starte don bicarb infusion for metabolic acidosis. Also reported of having poor oral intake x 1week. Denies diarrhea, chest pain, urinary symptoms, fever.      PAST HISTORY  --------------------------------------------------------------------------------  PAST MEDICAL & SURGICAL HISTORY:  Pancreatic cancer      FAMILY HISTORY:    PAST SOCIAL HISTORY:    ALLERGIES & MEDICATIONS  --------------------------------------------------------------------------------  Allergies    Allergy Status Unknown    Intolerances    Standing Inpatient Medications  chlorhexidine 4% Liquid 1 Application(s) Topical <User Schedule>  influenza  Vaccine (HIGH DOSE) 0.7 milliLiter(s) IntraMuscular once  norepinephrine Infusion 0.05 MICROgram(s)/kG/Min IV Continuous <Continuous>  sodium bicarbonate  Infusion 0.225 mEq/kG/Hr IV Continuous <Continuous>    PRN Inpatient Medications    REVIEW OF SYSTEMS  --------------------------------------------------------------------------------  Gen: No fevers/chills, gen weakness+  Skin: No rashes  Head/Eyes/Ears: Normal hearing,   Respiratory: No dyspnea, cough  CV: No chest pain  GI: No abdominal pain, diarrhea  : No dysuria, hematuria  MSK: No  edema  Heme: No easy bruising or bleeding  Psych: No significant depression    All other systems were reviewed and are negative, except as noted.    VITALS/PHYSICAL EXAM  --------------------------------------------------------------------------------  T(C): 34.3 (11-08-21 @ 12:15), Max: 36.4 (11-08-21 @ 00:15)  HR: 96 (11-08-21 @ 13:00) (96 - 117)  BP: 111/60 (11-08-21 @ 13:00) (53/37 - 148/127)  RR: 27 (11-08-21 @ 13:00) (20 - 34)  SpO2: 100% (11-08-21 @ 13:00) (85% - 100%)  Wt(kg): --    Weight (kg): 50 (11-08-21 @ 07:55)    11-08-21 @ 07:01  -  11-08-21 @ 14:03  --------------------------------------------------------  IN: 724 mL / OUT: 0 mL / NET: 724 mL    Physical Exam:  	Gen: NAD, on Face mask  	HEENT: MMM, anciteric  	Pulm: CTA B/L, no crackles  	CV: S1S2  	Abd: Distended , firm, +BS   	Ext: No LE edema B/L  	Neuro: Awake  	Skin: Warm and dry  	Vascular access:    LABS/STUDIES  --------------------------------------------------------------------------------              5.2    13.26 >-----------<  334      [11-08-21 @ 08:54]              16.9     133  |  94  |  80  ----------------------------<  16      [11-08-21 @ 08:54]  6.0   |  10  |  2.18        Ca     8.0     [11-08-21 @ 08:54]    TPro  3.9  /  Alb  2.3  /  TBili  1.4  /  DBili  x   /  AST  1604  /  ALT  897  /  AlkPhos  126  [11-08-21 @ 08:54]    PT/INR: PT 17.7 , INR 1.57       [11-07-21 @ 21:46]  PTT: 25.3       [11-07-21 @ 21:46]    Creatinine Trend:  SCr 2.18 [11-08 @ 08:54]  SCr 2.32 [11-08 @ 06:47]  SCr 1.87 [11-07 @ 21:46] [facility] DIVISION OF KIDNEY DISEASES AND HYPERTENSION -- [phone]  -- INITIAL CONSULT NOTE  --------------------------------------------------------------------------------  HPI: 65 yo male pt PMH prostate Ca (s/p radiation), recently found to have elevated bilirubin to 38 () subsequently was sent for CT ab that revealed multiple lesions in pancreas/liver/spleen as well as 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    Nephrology consultation requested for MAAME, hyperkalemia.   On admission, Scr was elevated to 2.32 with Serum potassium of 6.2 on 21. On review of [facility], it was found that Scr was 0.81 on 21. Pt. denies Hx of chronic kidney disease. Reported Hx of kidney Ca on the mother side. Denies use of NSAIDs/ motrin recently. Pt. stated that he was diagnosed with pancreatic cancer 3 weeks ago and also told about ascites 1 week ago. CT abdo done on  showed distended stomach and ascites. Pt. received medical management for hyperkalemia. Also received blood transfusion for low Hb. Starte don bicarb infusion for metabolic acidosis. Also reported of having poor oral intake x 1week. Denies diarrhea, chest pain, urinary symptoms, fever.      PAST HISTORY  --------------------------------------------------------------------------------  PAST MEDICAL & SURGICAL HISTORY:  Pancreatic cancer      FAMILY HISTORY: Mother with RCC,  from kidney cancer.    PAST SOCIAL HISTORY: No smoking, drugs or alcohol use.     ALLERGIES & MEDICATIONS  --------------------------------------------------------------------------------  Allergies    Allergy Status Unknown    Intolerances    Standing Inpatient Medications  chlorhexidine 4% Liquid 1 Application(s) Topical <User Schedule>  influenza  Vaccine (HIGH DOSE) 0.7 milliLiter(s) IntraMuscular once  norepinephrine Infusion 0.05 MICROgram(s)/kG/Min IV Continuous <Continuous>  sodium bicarbonate  Infusion 0.225 mEq/kG/Hr IV Continuous <Continuous>    PRN Inpatient Medications    REVIEW OF SYSTEMS  --------------------------------------------------------------------------------  Gen: No fevers/chills, gen weakness+  Skin: No rashes  Head/Eyes/Ears: Normal hearing,   Respiratory: No dyspnea, cough  CV: No chest pain  GI: No abdominal pain, diarrhea  : No dysuria, hematuria  MSK: No  edema  Heme: No easy bruising or bleeding  Psych: No significant depression    All other systems were reviewed and are negative, except as noted.    VITALS/PHYSICAL EXAM  --------------------------------------------------------------------------------  T(C): 34.3 (21 @ 12:15), Max: 36.4 (21 @ 00:15)  HR: 96 (21 @ 13:00) (96 - 117)  BP: 111/60 (21 @ 13:00) (53/37 - 148/127)  RR: 27 (21 @ 13:00) (20 - 34)  SpO2: 100% (21 @ 13:00) (85% - 100%)  Wt(kg): --    Weight (kg): 50 (21 @ 07:55)    21 @ 07:01  -  21 @ 14:03  --------------------------------------------------------  IN: 724 mL / OUT: 0 mL / NET: 724 mL    Physical Exam:  	Gen: NAD, on Face mask  	HEENT: MMM, anciteric  	Pulm: CTA B/L, no crackles  	CV: S1S2  	Abd: Distended , firm, +BS   	Ext: No LE edema B/L  	Neuro: Awake  	Skin: Warm and dry  	Vascular access:    LABS/STUDIES  --------------------------------------------------------------------------------              5.2    13.26 >-----------<  334      [21 @ 08:54]              16.9     133  |  94  |  80  ----------------------------<  16      [21 @ 08:54]  6.0   |  10  |  2.18        Ca     8.0     [21 @ 08:54]    TPro  3.9  /  Alb  2.3  /  TBili  1.4  /  DBili  x   /  AST  1604  /  ALT  897  /  AlkPhos  126  [21 @ 08:54]    PT/INR: PT 17.7 , INR 1.57       [21 @ 21:46]  PTT: 25.3       [21 @ 21:46]    Creatinine Trend:  SCr 2.18 [ @ 08:54]  SCr 2.32 [ @ 06:47]  SCr 1.87 [ @ 21:46]

## 2021-11-08 NOTE — PHARMACOTHERAPY INTERVENTION NOTE - COMMENTS
Medication history is incomplete. Medication list verified with [facility]. Unable to verify patient's medication list with two sources. Please call spectra [phone] if you have any questions.

## 2021-11-08 NOTE — CONSULT NOTE ADULT - PROBLEM SELECTOR RECOMMENDATION 3
Pt. with metabolic acidosis in the setting of MAAME. on admission, serum sodium bicarb was low at 13. serum sodium bicarb is at 10 today. Currently receiving sodium bicarb infusion . Recommend continuing bicarb infusion. for now. plan for HD as mentioned above. Monitor serum sodium bicarb daily.     If you have any questions, please feel free to contact me  Brenden Piedra  Nephrology Fellow  [phone]  (After 5pm or on weekends please page the on-call fellow).

## 2021-11-08 NOTE — CONSULT NOTE ADULT - PROBLEM SELECTOR RECOMMENDATION 9
Pt with oliguric MAAME in the setting of poor oral intake , hypovolemia and anemia. On admission, Scr was elevated to 2.32 on 11/8/21. On review of [facility], it was found that Scr was 0.81 on 9/21/21. CT abdo ruled out obstruction. Check UA , urine electrolytes. Received blood transfusion and currently on sodium bicarb infusion@ 75cc/hr. Agree with sodium bicarb for now. Blood transfusion as per primary team. Discussed with the pt. and family regarding HD in view of hyperkalemia and acidosis. HD consent obtained and kept in pts. chart. Monitor labs and urine output. Avoid any potential nephrotoxins. Dose medications as per eGFR.

## 2021-11-08 NOTE — H&P ADULT - HISTORY OF PRESENT ILLNESS
66M with pancreatic ca (diagnosed 3 weeks ago) who presented for dyspnea, n/v and increased abdominal distention. Ascites began 1 week ago and has been worsening since. In ED, hypothermic. Labs significant for WBC 12 (8% bands), Hgb 7.3->5.2, K 6.2, bicarb 13, Cr 1.87, and transaminitis. VBG pH 7.08, lactate 15.9. S/p diagnostic paracentesis. Patient became hypotensive and minimally responsive. Started on levo and admitted to MICU.

## 2021-11-08 NOTE — ED PROVIDER NOTE - CLINICAL SUMMARY MEDICAL DECISION MAKING FREE TEXT BOX
65 yo male pt pmh pancreatic ca who presents w abdominal pain, n/v, SOB. With tender abdomen on exam. Emesis during stay. Seen jaundiced, icteric and anasarca. Will do imaging, labs. Most likely mets vs infectous vs 65 yo male pt pmh pancreatic ca who presents w abdominal pain, n/v, SOB. With tender abdomen on exam. Emesis during stay. Seen jaundiced, icteric and anasarca. Will do imaging, labs. Most likely mets vs infectous/SBP. Most likely admit 65 yo male pt pmh pancreatic ca who presents w abdominal pain, n/v, SOB. With tender abdomen on exam. Emesis during stay. Seen jaundiced, icteric and anasarca. Will do imaging, labs. Most likely mets vs infectous/SBP. Most likely admit    CRITICAL CARE TIME (74 minutes) WAS NECESSARY TO TREAT OR PREVENT LIFE THREATENING DETERIORATION FROM THE FOLLOWING CONDITIONS:  [X  ] Heart Failure and/or Circulatory Collapse and or MI/NSTEMI  [X  ] Sepsis [  ] Respiratory failure  [  ]CNS Failure or Compromise    [  ]Dehydration [  ]Renal Failure [X  ]Hepatic Failure Sepsis [  ]Endocrine Crisis  [  ]Metabolic Crisis  [  ]Toxidrome   [  ]Trauma    CRITICAL CARE TIME WAS SPENT BY ME IN THE FOLLOWING ACTIVITIES:  [X  ] Performance of the History and Physical Examination [X  ] Review of Old Charts [   ] IV Access and or Obtaining Necessary Diagnostic Tests   [X  ] Ordering and Performing Treatments and Interventions:   Specifically:  [  ] Ventilator Management [  ] Vascular Access Procedures [  ] NGT Placement  [  ] Transcutaneous Pacing  [ X ] Establishment of Goals of Care with the Patient or Their Designated Representative  [X  ] Developing and Evaluating Treatment Plan with Consultants and/or the Primary Provider  [ X ] Serial Reevaluation of the Patients Condition and Response to Therapeutic Measures   Specifically: [  ] Interpretation of Cardiac and Respiratory Parameters  [X  ]Ordering and Interpretating  Diagnostic Studies  Comments:

## 2021-11-08 NOTE — DISCHARGE NOTE FOR THE EXPIRED PATIENT - HOSPITAL COURSE
66M with pancreatic ca (diagnosed 3 weeks ago) who presented for dyspnea, n/v and increased abdominal distention. Ascites began 1 week ago and has been worsening since. In ED, hypothermic. Labs significant for WBC 12 (8% bands), Hgb 7.3->5.2, K 6.2, bicarb 13, Cr 1.87, and transaminitis. VBG pH 7.08, lactate 15.9. S/p diagnostic paracentesis. Patient became hypotensive and minimally responsive. Started on levo and admitted to MICU. Patient went into cardiac arrest.  66M with pancreatic ca (diagnosed 3 weeks ago) who presented for dyspnea, n/v and increased abdominal distention. Ascites began 1 week ago and has been worsening since. In ED, hypothermic. Labs significant for WBC 12 (8% bands), Hgb 7.3->5.2, K 6.2, bicarb 13, Cr 1.87, and transaminitis. VBG pH 7.08, lactate 15.9. S/p diagnostic paracentesis. Patient became hypotensive and minimally responsive. Started on levo and admitted to MICU. Patient had black coffee ground emesis and became unresponsive and pulseless. CPR initiated, Epinephrine x1 given. After 3 rounds of CPR, patient remained pulseless and EKG showed asystole. Patient pronounced dead at 14:12 on 11/6/21.

## 2021-11-08 NOTE — ED PROVIDER NOTE - PHYSICAL EXAMINATION
GENERAL: Awake, alert, NAD  HEENT: NC/AT, moist mucous membranes, PERRL, EOMI  LUNGS: CTAB, no wheezes or crackles   CARDIAC: RRR, no m/r/g  ABDOMEN: Soft, normal BS, no rebound, no guarding. distended abdomen, tender LLQ abdomen  BACK: No midline spinal tenderness, no CVA tenderness  EXT: No edema, no calf tenderness, 2+ DP pulses bilaterally, no deformities.  NEURO: A&Ox3. Moving all extremities.  SKIN: Warm and dry. No rash.  PSYCH: Normal affect. GENERAL: Awake, alert, NAD, anasarca  HEENT: NC/AT, moist mucous membranes, icteric conjunctiva  LUNGS: CTAB, no wheezes or crackles   CARDIAC: RRR, no m/r/g  ABDOMEN: Soft, normal BS, no rebound, no guarding. distended abdomen, tender LLQ abdomen  BACK: No midline spinal tenderness, no CVA tenderness  EXT: no calf tenderness, 2+ DP pulses bilaterally, no deformities. BL lower extremities edema 2+  NEURO: A&Ox3. Moving all extremities.  SKIN: Warm and dry. Jaundiced throught GENERAL: Awake, alert, NAD, anasarca and temporal wasting  HEENT: NC/AT, moist mucous membranes, icteric conjunctiva  LUNGS: CTAB, no wheezes or crackles   CARDIAC: RRR, no m/r/g  ABDOMEN: Soft, normal BS, no rebound, no guarding. distended abdomen, tender LLQ abdomen  BACK: No midline spinal tenderness, no CVA tenderness  EXT: no calf tenderness, 2+ DP pulses bilaterally, no deformities. BL lower extremities edema 2+  NEURO: A&Ox3. Moving all extremities.  SKIN: Warm and dry. Jaundiced throught

## 2021-11-08 NOTE — ED PROVIDER NOTE - OBJECTIVE STATEMENT
65 yo male pt PMH pancreatic ca who complains of SOB, LLQ abdominal pain, n/v since noon. Patient refers one episode of vomiting non bloody. Denies diarrhea, chest pain, urinary symptoms, fever. Patient's pancreatic cancer was diagnosed 3 weeks ago. 65 yo male pt PMH pancreatic ca who complains of SOB, LLQ abdominal pain, n/v since noon. Patient refers one episode of vomiting non bloody. Denies diarrhea, chest pain, urinary symptoms, fever. Patient's pancreatic cancer was diagnosed 3 weeks ago. Ascites began 1 week ago and has been worsening since. Came in hypothermic.

## 2021-11-08 NOTE — ED PROVIDER NOTE - PROGRESS NOTE DETAILS
hyperk; ordered calcium, dextrose and insulin, rpt ekg hyperk; ordered calcium, dextrose and insulin, ekg wnl. will rpt labs, lactate diagnostic paracentesis done at bedside w/o complications LIZ Chi: Pt signed out to me by overnight team, spoke with Brother Moose and significant other Loretta, have not had prior goals of care discussion with patient, pt is full code. LIZ Chi: Spoke with MICU, will see pt in the ED LIZ Chi: Pt signed out to me by overnight team, pt now hypotensive, minimally responsive, started on levophed. Spoke with Brother Moose and significant other Loretta, have not had prior goals of care discussion with patient, pt is full code. LIZ Chi: Spoke with pts brother Moose, cell number [phone]. Pt accepted to [facility].

## 2021-11-08 NOTE — CHART NOTE - NSCHARTNOTEFT_GEN_A_CORE
Called to bedside to evaluate the patient for altered mental status.     At bedside, patient was unresponsive and pulseless. CPR initiated, Epinephrine x1 given. After 3 rounds of CPR, patient remained pulseless and EKG showed asystole.    On physical exam, patient did not respond to verbal or noxious stimuli.  No spontaneous respirations.  Absent heart and breath sounds.  Absent radial and carotid pulses. Pupils are fixed and dilated, no corneal reflex. Bedside ultrasound demonstrated absent cardiac activity. Patient pronounced dead at 14:12 on 11/6/21.  Attending notified. Message left with family for urgent callback.

## 2021-11-08 NOTE — CHART NOTE - NSCHARTNOTEFT_GEN_A_CORE
Provider attempted to place oliveira. Was unsuccessful. Pt with history of prostate CA s/p radiation.

## 2021-11-08 NOTE — H&P ADULT - ASSESSMENT
ASSESSMENT  66M with pancreatic ca (diagnosed 3 weeks ago) who presented with increased abdominal distention, admitted to MICU for hypotension 2/2 septic shock.    PLAN  Neuro  A&Ox4 at baseline  #Metabolic encephalopathy    CV  #Cardiac arrest  See event note.    #Hypotension  Likely 2/2 septic shock  - Requiring pressor support    Respiratory  #AHRF  - Requiring non-rebreather    GI  #Ascites  s/p paracentesis  Fluid studies suggest  Follows with Gastroenterologist Dr. Caden Harper    Renal  #MAAME  #Hyperkalemic    ID  #Sepsis  Hypotensive, hypothermic, leukocytosis    #SBP    Endo  #Hypoglycemic    Heme/Onc  #Pancreatic cancer  [facility]    #Anemia  Drop 7.3->5.2  s/p 1u pRBC  Likely 2/2 GIB, patient vomiting black material    Ethics  - Spoke to partner and brother at bedside earlier this afternoon. Stated that patient verbally stated that he wanted to be DNR earlier today.    Patient  at 14:12. See event note. ASSESSMENT  66M with pancreatic ca (diagnosed 3 weeks ago) who presented with increased abdominal distention, admitted to MICU for hypotension 2/2 septic shock.    PLAN  Neuro  A&Ox4 at baseline  #Metabolic encephalopathy    CV  #Cardiac arrest  See event note.    #Hypotension  Likely 2/2 septic shock vs hypovolemic shock  - Requiring pressor support    Respiratory  #AHRF  - Requiring non-rebreather    GI  #Ascites  s/p paracentesis  Fluid studies suggest  Follows with Gastroenterologist Dr. Caden Harper    #GIB  Dark black emesis and drop in Hgb    Renal  #MAAME  #Hyperkalemic    ID  #Sepsis  Hypotensive, hypothermic, leukocytosis    #SBP    Endo  #Hypoglycemic    Heme/Onc  #Pancreatic cancer  [facility]    #Anemia  Drop 7.3->5.2  s/p 1u pRBC  Likely 2/2 GIB, patient vomiting black material    Ethics  - Spoke to partner and brother at bedside earlier this afternoon. Stated that patient verbally stated that he wanted to be DNR earlier today.    Patient  at 14:12. See event note. ASSESSMENT  66M with pancreatic ca (diagnosed 3 weeks ago) who presented with increased abdominal distention, admitted to MICU for hypotension 2/2 septic shock.    PLAN  Neuro  A&Ox4 at baseline  #Metabolic encephalopathy    CV  #Cardiac arrest  See event note.    #Hypotension  Likely 2/2 septic shock vs hypovolemic shock  - Requiring pressor support    Respiratory  #AHRF  - Requiring non-rebreather    GI  #Ascites  s/p paracentesis  Fluid studies suggest  Follows with Gastroenterologist Dr. Caden Harper    #GIB  Dark black emesis and drop in Hgb    Renal  #MAAME  #Hyperkalemic    ID  #Sepsis  Hypotensive, hypothermic, leukocytosis    Endo  #Hypoglycemic    Heme/Onc  #Pancreatic cancer  [facility]    #Anemia  Drop 7.3->5.2  s/p 1u pRBC  Likely 2/2 GIB, patient vomiting black material    Ethics  - Spoke to partner and brother at bedside earlier this afternoon. Stated that patient verbally stated that he wanted to be DNR earlier today.    Patient  at 14:12. See event note. ASSESSMENT  66M with pancreatic ca (diagnosed 3 weeks ago) who presented with increased abdominal distention, admitted to MICU for hypotension 2/2 septic shock.    PLAN  Neuro  A&Ox4 at baseline  #Metabolic encephalopathy    CV  #Cardiac arrest  See event note.    #Hypotension  Likely 2/2 septic shock vs hypovolemic shock  - Requiring pressor support    Respiratory  #AHRF  - Requiring non-rebreather    GI  #Ascites  s/p paracentesis  Fluid studies suggest  Follows with Gastroenterologist Dr. Caden Harper    #Transaminitis  2/2 shock liver    #GIB  Dark black emesis and drop in Hgb    Renal  #MAAME  #Hyperkalemic    ID  #Sepsis  Hypotensive, hypothermic, leukocytosis  - cultures sent    Endo  #Hypoglycemic    Heme/Onc  #Pancreatic cancer  [facility]    #Anemia  Drop 7.3->5.2  s/p 1u pRBC  Likely 2/2 GIB, patient vomiting black material    Ethics  - Spoke to partner and brother at bedside earlier this afternoon. Stated that patient verbally stated that he wanted to be DNR earlier today.    Patient  at 14:12. See event note.

## 2021-11-13 LAB
CULTURE RESULTS: SIGNIFICANT CHANGE UP
SPECIMEN SOURCE: SIGNIFICANT CHANGE UP

## 2021-12-08 LAB
CULTURE RESULTS: SIGNIFICANT CHANGE UP
SPECIMEN SOURCE: SIGNIFICANT CHANGE UP

## 2023-03-07 NOTE — H&P ADULT - NSHPLABSRESULTS_GEN_ALL_CORE
5.2    13.26 )-----------( 334      ( 08 Nov 2021 08:54 )             16.9       11-08    133<L>  |  94<L>  |  80<H>  ----------------------------<  16<LL>  6.0<H>   |  10<LL>  |  2.18<H>    Ca    8.0<L>      08 Nov 2021 08:54    TPro  3.9<L>  /  Alb  2.3<L>  /  TBili  1.4<H>  /  DBili  x   /  AST  1604<H>  /  ALT  897<H>  /  AlkPhos  126<H>  11-08          ABG - ( 08 Nov 2021 06:47 )  pH, Arterial: 7.09  pH, Blood: x     /  pCO2: 33    /  pO2: 54    / HCO3: 10    / Base Excess: -18.2 /  SaO2: 72.5                    PT/INR - ( 07 Nov 2021 21:46 )   PT: 17.7 sec;   INR: 1.57 ratio         PTT - ( 07 Nov 2021 21:46 )  PTT:25.3 sec    Lactate Trend            CAPILLARY BLOOD GLUCOSE      POCT Blood Glucose.: 123 mg/dL (08 Nov 2021 10:22) Type and screen pending OB team aware